# Patient Record
Sex: MALE | Race: WHITE | NOT HISPANIC OR LATINO | Employment: FULL TIME | URBAN - METROPOLITAN AREA
[De-identification: names, ages, dates, MRNs, and addresses within clinical notes are randomized per-mention and may not be internally consistent; named-entity substitution may affect disease eponyms.]

---
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
## 2020-01-13 ENCOUNTER — OFFICE VISIT (OUTPATIENT)
Dept: URGENT CARE | Facility: PHYSICIAN GROUP | Age: 20
End: 2020-01-13
Payer: COMMERCIAL

## 2020-01-13 VITALS
HEART RATE: 79 BPM | RESPIRATION RATE: 16 BRPM | HEIGHT: 67 IN | BODY MASS INDEX: 34.25 KG/M2 | DIASTOLIC BLOOD PRESSURE: 78 MMHG | OXYGEN SATURATION: 97 % | SYSTOLIC BLOOD PRESSURE: 118 MMHG | TEMPERATURE: 97.6 F | WEIGHT: 218.2 LBS

## 2020-01-13 DIAGNOSIS — K12.2 UVULITIS: ICD-10-CM

## 2020-01-13 LAB
INT CON NEG: NEGATIVE
INT CON POS: POSITIVE
S PYO AG THROAT QL: NEGATIVE

## 2020-01-13 PROCEDURE — 87880 STREP A ASSAY W/OPTIC: CPT | Performed by: PHYSICIAN ASSISTANT

## 2020-01-13 PROCEDURE — 99214 OFFICE O/P EST MOD 30 MIN: CPT | Performed by: PHYSICIAN ASSISTANT

## 2020-01-13 RX ORDER — METHYLPREDNISOLONE 4 MG/1
TABLET ORAL
Qty: 21 TAB | Refills: 0 | Status: SHIPPED
Start: 2020-01-13 | End: 2020-06-23

## 2020-01-13 RX ORDER — AMOXICILLIN AND CLAVULANATE POTASSIUM 875; 125 MG/1; MG/1
1 TABLET, FILM COATED ORAL 2 TIMES DAILY
Qty: 20 TAB | Refills: 0 | Status: SHIPPED | OUTPATIENT
Start: 2020-01-13 | End: 2020-01-23

## 2020-01-13 ASSESSMENT — ENCOUNTER SYMPTOMS
FEVER: 0
NAUSEA: 0
SHORTNESS OF BREATH: 0
WHEEZING: 0
PALPITATIONS: 0
SWOLLEN GLANDS: 0
CHILLS: 0
COUGH: 0
DIARRHEA: 0
MYALGIAS: 0
TROUBLE SWALLOWING: 1
SORE THROAT: 1
HEADACHES: 0
NECK PAIN: 0
SPUTUM PRODUCTION: 0
VOMITING: 0
HOARSE VOICE: 0

## 2020-01-13 NOTE — PROGRESS NOTES
"Subjective:      Narciso Casillas is a 19 y.o. male who presents with Sore Throat (has an extremely dry throat, crackling in the back of his throat, cough, feels its swollen, woke up with it this morning)            Pharyngitis    This is a new problem. The current episode started today. The problem has been unchanged. There has been no fever. The pain is moderate. Associated symptoms include trouble swallowing. Pertinent negatives include no congestion, coughing, diarrhea, ear discharge, ear pain, headaches, hoarse voice, plugged ear sensation, neck pain, shortness of breath, swollen glands or vomiting. Associated symptoms comments: Patient reports swelling in the throat. He has tried nothing for the symptoms.       Past Medical History:   Diagnosis Date   • Asperger syndrome    • Psoriasis        No past surgical history on file.    No family history on file.    Allergies   Allergen Reactions   • Sulfa Drugs        Medications, Allergies, and current problem list reviewed today in Epic    Review of Systems   Constitutional: Negative for chills, fever and malaise/fatigue.   HENT: Positive for sore throat and trouble swallowing. Negative for congestion, ear discharge, ear pain and hoarse voice.    Respiratory: Negative for cough, sputum production, shortness of breath and wheezing.    Cardiovascular: Negative for chest pain, palpitations and leg swelling.   Gastrointestinal: Negative for diarrhea, nausea and vomiting.   Musculoskeletal: Negative for myalgias and neck pain.   Neurological: Negative for headaches.       All other systems reviewed and are negative.      Objective:     /78 (BP Location: Right arm, Patient Position: Sitting, BP Cuff Size: Adult)   Pulse 79   Temp 36.4 °C (97.6 °F) (Temporal)   Resp 16   Ht 1.702 m (5' 7\")   Wt 99 kg (218 lb 3.2 oz)   SpO2 97%   BMI 34.17 kg/m²      Physical Exam  Constitutional:       General: He is not in acute distress.     Appearance: Normal appearance. He " is not ill-appearing.   HENT:      Head: Normocephalic and atraumatic.      Right Ear: Tympanic membrane, ear canal and external ear normal.      Left Ear: Tympanic membrane, ear canal and external ear normal.      Nose: Nose normal.      Mouth/Throat:      Mouth: Mucous membranes are moist.      Pharynx: Pharyngeal swelling, posterior oropharyngeal erythema and uvula swelling present. No oropharyngeal exudate.      Tonsils: No tonsillar exudate or tonsillar abscesses. Swellin+ on the right. 2+ on the left.   Eyes:      Conjunctiva/sclera: Conjunctivae normal.   Cardiovascular:      Rate and Rhythm: Normal rate and regular rhythm.      Heart sounds: Normal heart sounds. No murmur. No friction rub. No gallop.    Pulmonary:      Effort: Pulmonary effort is normal.      Breath sounds: Normal breath sounds. No wheezing, rhonchi or rales.   Skin:     General: Skin is warm and dry.      Findings: No rash.   Neurological:      General: No focal deficit present.      Mental Status: He is alert and oriented to person, place, and time.   Psychiatric:         Mood and Affect: Mood normal.         Behavior: Behavior normal.         Thought Content: Thought content normal.         Judgment: Judgment normal.                 Assessment/Plan:       1. Uvulitis  POCT Rapid Strep A    amoxicillin-clavulanate (AUGMENTIN) 875-125 MG Tab    methylPREDNISolone (MEDROL DOSEPAK) 4 MG Tablet Therapy Pack       poct strep- negative      Current Outpatient Medications:   •  amoxicillin-clavulanate (AUGMENTIN) 875-125 MG Tab, Take 1 Tab by mouth 2 times a day for 10 days., Disp: 20 Tab, Rfl: 0    Contingent Medrol given to patient if swelling worsens  •  methylPREDNISolone (MEDROL DOSEPAK) 4 MG Tablet Therapy Pack, Follow schedule on package instructions., Disp: 21 Tab, Rfl: 0  \   Differential diagnoses, Supportive care, and indications for immediate follow-up discussed with patient.   Instructed to return to clinic or nearest emergency  department for any change in condition, further concerns, or worsening of symptoms.    The patient demonstrated a good understanding and agreed with the treatment plan.    Kristina Aguilera P.A.-C.

## 2020-01-13 NOTE — LETTER
January 13, 2020         Patient: Narciso Casillas   YOB: 2000   Date of Visit: 1/13/2020           To Whom it May Concern:    Narciso Casillas was seen in my clinic on 1/13/2020. He is excused from work from 1/13/2020-1/14/2020 due to medical reasons.    If you have any questions or concerns, please don't hesitate to call.        Sincerely,           Kristina Aguilera P.A.-C.  Electronically Signed

## 2020-01-20 ENCOUNTER — OFFICE VISIT (OUTPATIENT)
Dept: URGENT CARE | Facility: PHYSICIAN GROUP | Age: 20
End: 2020-01-20
Payer: COMMERCIAL

## 2020-01-20 VITALS
OXYGEN SATURATION: 98 % | SYSTOLIC BLOOD PRESSURE: 112 MMHG | HEIGHT: 67 IN | TEMPERATURE: 99.6 F | WEIGHT: 210 LBS | RESPIRATION RATE: 16 BRPM | DIASTOLIC BLOOD PRESSURE: 70 MMHG | HEART RATE: 112 BPM | BODY MASS INDEX: 32.96 KG/M2

## 2020-01-20 DIAGNOSIS — L50.9 URTICARIA: ICD-10-CM

## 2020-01-20 DIAGNOSIS — R21 RASH AND NONSPECIFIC SKIN ERUPTION: ICD-10-CM

## 2020-01-20 PROCEDURE — 99214 OFFICE O/P EST MOD 30 MIN: CPT | Performed by: PHYSICIAN ASSISTANT

## 2020-01-20 RX ORDER — METHYLPREDNISOLONE SODIUM SUCCINATE 125 MG/2ML
125 INJECTION, POWDER, LYOPHILIZED, FOR SOLUTION INTRAMUSCULAR; INTRAVENOUS ONCE
Status: COMPLETED | OUTPATIENT
Start: 2020-01-20 | End: 2020-01-20

## 2020-01-20 RX ADMIN — METHYLPREDNISOLONE SODIUM SUCCINATE 125 MG: 125 INJECTION, POWDER, LYOPHILIZED, FOR SOLUTION INTRAMUSCULAR; INTRAVENOUS at 13:30

## 2020-01-20 ASSESSMENT — ENCOUNTER SYMPTOMS
SORE THROAT: 0
SHORTNESS OF BREATH: 0
COUGH: 0
VOMITING: 0
STRIDOR: 0
FEVER: 0
CHILLS: 0
DIARRHEA: 0
ABDOMINAL PAIN: 0
WHEEZING: 0
SPUTUM PRODUCTION: 0
NAUSEA: 0

## 2020-01-20 NOTE — LETTER
January 20, 2020       Patient: Narciso Casillas   YOB: 2000   Date of Visit: 1/20/2020         To Whom It May Concern:    It is my medical opinion that Narciso Casillas should be excused from work for today due to illness.      If you have any questions or concerns, please don't hesitate to call [phone]          Sincerely,          Franko Flores P.A.-C.  Electronically Signed

## 2020-01-20 NOTE — PROGRESS NOTES
"Subjective:   Narciso Casillas  is a 19 y.o. male who presents for Hives (hives around the side of abdomen, legs, neck, and on the head, itchy, pt. noticed it just last night  after being out in the woods, pt took benadryl last night)        Patient comes clinic describing a pruritic rash on bilateral flanks.  Noted first on left flank and on right.  Notes has spread from near waistline up to the axillary area bilaterally.  Notes some on side of neck, forehead and arms as well.  Notes past medical history of similar rash with exposure to sulfa drugs.  Patient is on day 8 of the 10-day amoxicillin course treating uvulitis.  He reports significant improvement of swelling and discomfort in back of throat.  States he did not start/take Medrol Dosepak stating this was reserved for worsened swelling which he did not experience.  He denies worsened swelling or sore throat stating he is only had improvement since treatment began.  Denies past medical history of similar reactions with amoxicillin but cannot recall last time he had this medicine.  Denies nausea vomiting abdominal pain diarrhea or rash.  Notes improvement of symptoms for which he was seen prior.  Denies sensation of swelling to throat or wheezing.  Denies past medical history of anaphylaxis    Review of Systems   Constitutional: Negative for chills and fever.   HENT: Positive for congestion. Negative for ear pain and sore throat ( improved).    Respiratory: Negative for cough, sputum production, shortness of breath, wheezing and stridor.    Gastrointestinal: Negative for abdominal pain, diarrhea, nausea and vomiting.   Skin: Positive for itching and rash.     Allergies   Allergen Reactions   • Sulfa Drugs       Objective:   /70 (BP Location: Left arm, Patient Position: Sitting, BP Cuff Size: Adult)   Pulse (!) 112   Temp 37.6 °C (99.6 °F) (Temporal)   Resp 16   Ht 1.702 m (5' 7\")   Wt 95.3 kg (210 lb)   SpO2 98%   BMI 32.89 kg/m²   Physical " Exam  Vitals signs and nursing note reviewed.   Constitutional:       General: He is not in acute distress.     Appearance: He is well-developed. He is not diaphoretic.   HENT:      Head: Normocephalic and atraumatic.      Right Ear: Tympanic membrane, ear canal and external ear normal.      Left Ear: Tympanic membrane, ear canal and external ear normal.      Nose: Nose normal.      Mouth/Throat:      Lips: Pink.      Mouth: Mucous membranes are moist.      Pharynx: Uvula midline. Posterior oropharyngeal erythema ( mild PND) present. No pharyngeal swelling, oropharyngeal exudate or uvula swelling ( trace erythema to tip, improved swelling).      Tonsils: No tonsillar abscesses.   Eyes:      General: No scleral icterus.        Right eye: No discharge.         Left eye: No discharge.      Conjunctiva/sclera: Conjunctivae normal.   Neck:      Musculoskeletal: Neck supple.   Pulmonary:      Effort: Pulmonary effort is normal. No respiratory distress.      Breath sounds: No decreased breath sounds, wheezing, rhonchi or rales.   Musculoskeletal: Normal range of motion.   Lymphadenopathy:      Cervical: Cervical adenopathy ( mild bilat) present.   Skin:     General: Skin is warm and dry.      Coloration: Skin is not pale.   Neurological:      Mental Status: He is alert and oriented to person, place, and time.      Coordination: Coordination normal.     solumedrol 125 - tolerates well      Assessment/Plan:   1. Rash and nonspecific skin eruption    2. Urticaria  - methylPREDNISolone sod succ (SOLU-MEDROL) 125 MG injection 125 mg    Other orders  - diphenhydrAMINE HCl (BENADRYL ALLERGY PO); Take  by mouth.  Supportive care is reviewed with patient/caregiver - recommend to push PO fluids and electrolytes, Solu-Medrol in clinic today, begin Medrol Dosepak tomorrow morning (from prior evaluation), we reviewed red flag symptoms to return to clinic-improvement in uvula no indication for further antibiotic treatment    Return to  clinic with lack of resolution or progression of symptoms.  ER precautions with any worsening symptoms are reviewed with patient/caregiver and they do express understanding  Cautioned regarding potential side effects of steroid, avoid nsaids while using    Differential diagnosis, natural history, supportive care, and indications for immediate follow-up discussed.

## 2020-05-17 ENCOUNTER — OFFICE VISIT (OUTPATIENT)
Dept: URGENT CARE | Facility: PHYSICIAN GROUP | Age: 20
End: 2020-05-17
Payer: COMMERCIAL

## 2020-05-17 VITALS
HEIGHT: 67 IN | HEART RATE: 74 BPM | DIASTOLIC BLOOD PRESSURE: 84 MMHG | OXYGEN SATURATION: 99 % | RESPIRATION RATE: 16 BRPM | WEIGHT: 210 LBS | SYSTOLIC BLOOD PRESSURE: 128 MMHG | TEMPERATURE: 98.8 F | BODY MASS INDEX: 32.96 KG/M2

## 2020-05-17 DIAGNOSIS — L40.9 PSORIASIS: ICD-10-CM

## 2020-05-17 PROCEDURE — 99214 OFFICE O/P EST MOD 30 MIN: CPT | Performed by: PHYSICIAN ASSISTANT

## 2020-05-17 RX ORDER — CALCIPOTRIENE 50 UG/G
CREAM TOPICAL
Qty: 60 G | Refills: 0 | Status: SHIPPED
Start: 2020-05-17 | End: 2020-09-21

## 2020-05-17 ASSESSMENT — ENCOUNTER SYMPTOMS
SORE THROAT: 0
FEVER: 0
CHILLS: 0

## 2020-05-17 NOTE — PROGRESS NOTES
"Subjective:      Narciso Casillas is a 19 y.o. male who presents with Rash (Bilateral feet, hands and scalp ongoing several months.  Has been using over the counter cream w/o relief.)            HPI  19-year-old male presents to urgent care with new problem of erythematous and pruritic rash over feet.  Denies pain or fever.  This has been intermittent over the last several months.  He has been seen in urgent care for same and treated with for psoriasis with calcipotriene cream.  Patient states his symptoms were temporarily improved. No recent steroid use.  Denies other associated aggravating or alleviating factors.     Review of Systems   Constitutional: Negative for chills, fever and malaise/fatigue.   HENT: Negative for sore throat.    Eyes: Negative for blurred vision and pain.   Respiratory: Negative for cough, shortness of breath, wheezing and stridor.    Cardiovascular: Negative for chest pain.   Gastrointestinal: Negative for abdominal pain, nausea and vomiting.   Musculoskeletal: Negative for myalgias.   Skin: Positive for itching and rash.   Neurological: Negative for dizziness, sensory change and headaches.   Endo/Heme/Allergies: Positive for environmental allergies. Does not bruise/bleed easily.       Past Medical History:   Diagnosis Date   • Asperger syndrome    • Psoriasis      Medications and allergies reviewed in [facility].  Social History     Tobacco Use   • Smoking status: Never Smoker   • Smokeless tobacco: Never Used   Substance Use Topics   • Alcohol use: No      Objective:     /84   Pulse 74   Temp 37.1 °C (98.8 °F) (Temporal)   Resp 16   Ht 1.702 m (5' 7\")   Wt 95.3 kg (210 lb)   SpO2 99%   BMI 32.89 kg/m²      Physical Exam  Vitals signs reviewed.   Constitutional:       General: He is not in acute distress.     Appearance: Normal appearance. He is well-developed.   HENT:      Head: Normocephalic and atraumatic.      Mouth/Throat:      Mouth: Mucous membranes are moist.      Pharynx: " Oropharynx is clear.   Eyes:      Conjunctiva/sclera: Conjunctivae normal.   Neck:      Musculoskeletal: Normal range of motion and neck supple.   Cardiovascular:      Rate and Rhythm: Normal rate and regular rhythm.      Heart sounds: Normal heart sounds.   Pulmonary:      Effort: Pulmonary effort is normal. No respiratory distress.      Breath sounds: Normal breath sounds.   Musculoskeletal: Normal range of motion.   Skin:     General: Skin is warm and dry.      Capillary Refill: Capillary refill takes less than 2 seconds.      Findings: Rash present. No erythema. Rash is purpuric and scaling.          Neurological:      General: No focal deficit present.      Mental Status: He is alert and oriented to person, place, and time.   Psychiatric:         Mood and Affect: Mood normal.         Behavior: Behavior normal.         Thought Content: Thought content normal.         Judgment: Judgment normal.                 Assessment/Plan:     1. Psoriasis  REFERRAL TO DERMATOLOGY    calcipotriene (DOVONEX) 0.005 % Cream     Given referral to dermatology for further evaluation and consultation.  PT should follow up with PCP in 1-2 days for re-evaluation if symptoms have not improved.  Discussed red flags and reasons to return to .  Pt and/or family verbalized understanding of diagnosis and follow up instructions and was offered informational handout on diagnosis.  PT discharged.

## 2020-05-17 NOTE — LETTER
May 17, 2020         Patient: Narciso Casillas   YOB: 2000   Date of Visit: 5/17/2020           To Whom it May Concern:    Narciso Casillas was seen in my clinic on 5/17/2020. Please excuse him from work 5/17 - 5/18.    If you have any questions or concerns, please don't hesitate to call.        Sincerely,           Eileen Sanders P.A.-C.  Electronically Signed

## 2020-05-18 ASSESSMENT — ENCOUNTER SYMPTOMS
DIZZINESS: 0
BLURRED VISION: 0
VOMITING: 0
MYALGIAS: 0
EYE PAIN: 0
HEADACHES: 0
NAUSEA: 0
SHORTNESS OF BREATH: 0
STRIDOR: 0
WHEEZING: 0
ABDOMINAL PAIN: 0
COUGH: 0
SENSORY CHANGE: 0
BRUISES/BLEEDS EASILY: 0

## 2020-05-21 ENCOUNTER — TELEPHONE (OUTPATIENT)
Dept: URGENT CARE | Facility: PHYSICIAN GROUP | Age: 20
End: 2020-05-21

## 2020-05-21 RX ORDER — CLOBETASOL PROPIONATE 0.5 MG/G
CREAM TOPICAL
Qty: 1 TUBE | Refills: 0 | Status: SHIPPED
Start: 2020-05-21 | End: 2020-09-21

## 2020-05-21 NOTE — TELEPHONE ENCOUNTER
Hello,   I have sent the patient a different treatment option. Can you let him know the medication will be at his pharmacy for him?     Thank you!  Eileen

## 2020-05-21 NOTE — TELEPHONE ENCOUNTER
1. Name: Narciso Casillas    Call Back Number: [phone]      How would the patient prefer to be contacted with a response: Phone call OK to leave a detailed message    2. Which medication(s) is being requested?calcipotriene (DOVONEX) 0.005 % Cream medication not covered by insurance please send alternative        Patient was informed they may receive a return phone call from our office with any additional questions before processing this request.

## 2020-05-28 ENCOUNTER — TELEMEDICINE (OUTPATIENT)
Dept: DERMATOLOGY | Facility: IMAGING CENTER | Age: 20
End: 2020-05-28
Payer: COMMERCIAL

## 2020-05-28 DIAGNOSIS — L40.9 PSORIASIS: ICD-10-CM

## 2020-05-28 PROCEDURE — 99203 OFFICE O/P NEW LOW 30 MIN: CPT | Mod: 95,CR | Performed by: DERMATOLOGY

## 2020-05-28 RX ORDER — CLOBETASOL PROPIONATE 0.46 MG/ML
SOLUTION TOPICAL
Qty: 60 ML | Refills: 3 | Status: SHIPPED | OUTPATIENT
Start: 2020-05-28 | End: 2021-10-04

## 2020-05-28 RX ORDER — CLOBETASOL PROPIONATE 0.5 MG/G
OINTMENT TOPICAL
Qty: 60 G | Refills: 3 | Status: SHIPPED | OUTPATIENT
Start: 2020-05-28

## 2020-05-28 NOTE — PROGRESS NOTES
VIRTUAL VIDEO DERMATOLOGY VISIT     As a means of avoiding spread of COVID-19, this visit is being conducted by synchronous video with patient. This video visit was initiated by the patient and they verbally consented.    Chief complaint: rash    HPI: legs and feet, elbows, scalp, knuckles  Onset: >10 years, about 1 month on the foot  Symptoms: itching  Aggravating factors: clobetasol and calcipotriene  Alleviating factors: no  Treatments: clobetasol started on 5/21/20; calcipotriene, medrol dose pack  Joint pain: no  Family hx psoriasis: no  Nails affected: no    History of skin cancer: No  Family history of skin cancer:Yes, Details: great grandfather (unknown type)    Past Medical History:   Diagnosis Date   • Asperger syndrome    • Psoriasis         History reviewed. No pertinent family history.     Social History     Socioeconomic History   • Marital status: Single     Spouse name: Not on file   • Number of children: Not on file   • Years of education: Not on file   • Highest education level: Not on file   Occupational History   • Not on file   Social Needs   • Financial resource strain: Not on file   • Food insecurity     Worry: Not on file     Inability: Not on file   • Transportation needs     Medical: Not on file     Non-medical: Not on file   Tobacco Use   • Smoking status: Never Smoker   • Smokeless tobacco: Never Used   Substance and Sexual Activity   • Alcohol use: No   • Drug use: No   • Sexual activity: Never   Lifestyle   • Physical activity     Days per week: Not on file     Minutes per session: Not on file   • Stress: Not on file   Relationships   • Social connections     Talks on phone: Not on file     Gets together: Not on file     Attends Orthodox service: Not on file     Active member of club or organization: Not on file     Attends meetings of clubs or organizations: Not on file     Relationship status: Not on file   • Intimate partner violence     Fear of current or ex partner: Not on file      Emotionally abused: Not on file     Physically abused: Not on file     Forced sexual activity: Not on file   Other Topics Concern   • Behavioral problems No   • Interpersonal relationships No   • Sad or not enjoying activities No   • Suicidal thoughts No   • Poor school performance No   • Reading difficulties No   • Speech difficulties No   • Writing difficulties Yes   • Inadequate sleep No   • Excessive TV viewing No   • Excessive video game use No   • Inadequate exercise No   • Sports related No   • Poor diet Yes   • Family concerns for drug/alcohol abuse No   • Poor oral hygiene Yes   • Bike safety No   • Family concerns vehicle safety No   Social History Narrative   • Not on file        Allergies   Allergen Reactions   • Sulfa Drugs         MEDICATIONS:  Medications relevant to specialty reviewed.    Current Outpatient Medications:   •  clobetasol (TEMOVATE) 0.05 % external solution, Apply daily to affected areas of the scalp for up to 3 weeks, then stop 1 week. Repeat as needed., Disp: 60 mL, Rfl: 3  •  clobetasol (TEMOVATE) 0.05 % Ointment, Apply BID to affected areas of body until skin is smooth or up to 3 weeks, then stop 1 week. Repeat as needed. Do not use on face/genitals., Disp: 60 g, Rfl: 3  •  clobetasol (TEMOVATE) 0.05 % Cream, Apply 1G to affected area BID, Disp: 1 Tube, Rfl: 0  •  calcipotriene (DOVONEX) 0.005 % Cream, Right 1 g to affected area twice daily, Disp: 60 g, Rfl: 0  •  diphenhydrAMINE HCl (BENADRYL ALLERGY PO), Take  by mouth., Disp: , Rfl:   •  methylPREDNISolone (MEDROL DOSEPAK) 4 MG Tablet Therapy Pack, Follow schedule on package instructions. (Patient not taking: Reported on 1/20/2020), Disp: 21 Tab, Rfl: 0  •  ondansetron (ZOFRAN ODT) 4 MG TABLET DISPERSIBLE, Take 1 Tab by mouth every 6 hours as needed for Nausea. (Patient not taking: Reported on 1/13/2020), Disp: 10 Tab, Rfl: 0     REVIEW OF SYSTEMS:   Positive for skin (see HPI)  Negative for fevers, chills, joint pain and  cough       EXAM:  There were no vitals taken for this visit.  Constitutional: Well-developed, well-nourished, and in no distress.   Neurological: Alert and oriented.    A focused skin exam limited by quality of video was performed including the affected areas of the head (including face), neck and bilateral lower extremities. Notable findings on exam today listed below.   Additionally, photos sent by patient via [employer] were reviewed.    -bilateral feet and lower legs with well demarcated erythematous plaques with minimal micaceous scale    IMPRESSION / PLAN:    1. Psoriasis, BSA ~4% (estimated by patient using his palm)  -psoriatic arthritis? no  -educated patient about diagnosis, management options, and expectations of treatment  -discussed the nature of the condition and that there is no cure   -discussed associated co-morbidities, including systemic inflammation (i.e. Joint involvement, cardiovascular disease); smoking cessation discussed  -instructed to start clobetasol 0.05% ointment to affected area of body twice daily to active areas for up to 3 weeks, stop 1 week, repeat prn  -start clobetasol 0.05% solution to affected area of the scalp daily up to 3 weeks, stop 1 week, repeat prn  -Side effects of topical steroids discussed, including systemic absorption, skin thinning, appearance of stretch marks (striae), easy bruising, telangiectasias, and possible increased hair growth   -Patient instructed to avoid face, axilla, and groin area with above topical steroids  -Dovonex too expensive/not covered  -Continue current tea tree oil shampoo - pt thinks it helps  -discussed importance of moisturizing regularly/several times a day  -pending response, consider adding calcipotriene ointment (appears to be on formulary) vs calcipo-betamethasone combo (aslo appears to be on formulary)    Return to clinic in: Return in about 3 months (around 8/28/2020). and as needed for any new or changing skin lesions.    Alberta FUENTES  SHANNON Mccarthy.

## 2020-06-23 ENCOUNTER — OFFICE VISIT (OUTPATIENT)
Dept: MEDICAL GROUP | Facility: PHYSICIAN GROUP | Age: 20
End: 2020-06-23
Payer: COMMERCIAL

## 2020-06-23 VITALS
TEMPERATURE: 98.4 F | SYSTOLIC BLOOD PRESSURE: 120 MMHG | WEIGHT: 208.8 LBS | OXYGEN SATURATION: 95 % | DIASTOLIC BLOOD PRESSURE: 70 MMHG | HEART RATE: 66 BPM | HEIGHT: 68 IN | BODY MASS INDEX: 31.64 KG/M2

## 2020-06-23 DIAGNOSIS — Z00.00 HEALTH CARE MAINTENANCE: ICD-10-CM

## 2020-06-23 DIAGNOSIS — L40.9 PSORIASIS: ICD-10-CM

## 2020-06-23 DIAGNOSIS — Z13.1 DIABETES MELLITUS SCREENING: ICD-10-CM

## 2020-06-23 DIAGNOSIS — Z13.220 LIPID SCREENING: ICD-10-CM

## 2020-06-23 DIAGNOSIS — E66.9 OBESITY (BMI 30.0-34.9): ICD-10-CM

## 2020-06-23 PROCEDURE — 99203 OFFICE O/P NEW LOW 30 MIN: CPT | Performed by: INTERNAL MEDICINE

## 2020-06-23 NOTE — PROGRESS NOTES
New Patient to establish    Chief Complaint   Patient presents with   • Establish Care   • Psoriasis       Subjective:     History of Present Illness: Patient is a 19 y.o. male who is here today to establish primary care      1. Obesity (BMI 30.0-34.9)  -Patient is aware of obesity, would like to have some loss of weight, eating healthier with starting healthy lifestyle,    Psoriasis   -Multiple psoriatic skin lesion on both elbows, hands, knees, feet, scalp,  -Patient is following up with dermatology, after using the clobetasol ointment as well as Dovonex cream patient is getting better  -Patient has an appointment dermatology August 2020    -Patient otherwise has no concern, in chart review mention that he had Asperger syndrome, however patient is not aware of that syndrome    Current Outpatient Medications on File Prior to Visit   Medication Sig Dispense Refill   • clobetasol (TEMOVATE) 0.05 % external solution Apply daily to affected areas of the scalp for up to 3 weeks, then stop 1 week. Repeat as needed. 60 mL 3   • clobetasol (TEMOVATE) 0.05 % Ointment Apply BID to affected areas of body until skin is smooth or up to 3 weeks, then stop 1 week. Repeat as needed. Do not use on face/genitals. 60 g 3   • calcipotriene (DOVONEX) 0.005 % Cream Right 1 g to affected area twice daily 60 g 0   • diphenhydrAMINE HCl (BENADRYL ALLERGY PO) Take  by mouth.     • clobetasol (TEMOVATE) 0.05 % Cream Apply 1G to affected area BID 1 Tube 0     No current facility-administered medications on file prior to visit.      Allergies   Allergen Reactions   • Sulfa Drugs      Patient Active Problem List    Diagnosis Date Noted   • Obesity (BMI 30.0-34.9) 06/23/2020   • Asperger syndrome 07/25/2017     Past Medical History:   Diagnosis Date   • Allergy    • Asperger syndrome    • Psoriasis      History reviewed. No pertinent surgical history.  Family History   Problem Relation Age of Onset   • No Known Problems Mother    • Lung Disease  "Father         asthma   • Asthma Brother    • Lung Disease Brother         asthma   • No Known Problems Maternal Grandmother    • No Known Problems Maternal Grandfather    • No Known Problems Paternal Grandmother    • No Known Problems Paternal Grandfather      Social History     Tobacco Use   • Smoking status: Never Smoker   • Smokeless tobacco: Never Used   Substance Use Topics   • Alcohol use: No   • Drug use: No     ROS:     - Constitutional: Negative for fever, chills,    - Eye: Negative for blurry vision    -ENT: Negative for ear pain    - Respiratory: Negative for cough, hemoptysis    - Cardiovascular: Negative for chest pain     - Gastrointestinal: Negative for abdominal pain    - Genitourinary: Negative for dysuria    - Musculoskeletal: Negative for joint swelling    - Skin: Negative for itching    - Neurological: Negative for focal weakness     - Psychiatric/Behavioral: Negative for depression        Physical Exam:     /70 (BP Location: Left arm, Patient Position: Sitting, BP Cuff Size: Adult)   Pulse 66   Temp 36.9 °C (98.4 °F) (Temporal)   Ht 1.735 m (5' 8.31\")   Wt 94.7 kg (208 lb 12.8 oz)   SpO2 95%   BMI 31.46 kg/m²   General: Normal appearing. No distress.  ENT: oropharynx without exudates.    Eyes: conjunctiva clear lids without ptosis  Pulmonary: Clear to ausculation.  Normal effort.   Cardiovascular: Regular rate and rhythm  Abdomen: Soft, nontender,  Lymph: No cervical or supraclavicular palpable lymph nodes  Psych: Normal mood and affect.     I have reviewed pertinent labs and diagnostic tests associated with this visit with specific comments listed under the assessment and plan below      Assessment and Plan:     1. Obesity (BMI 30.0-34.9)  >> Educated regarding healthy diet, would like to Visit to discuss in detail about healthy lifestyle  - Comp Metabolic Panel; Future  - HEMOGLOBIN A1C; Future  - Lipid Profile; Future  - TSH WITH REFLEX TO FT4; Future  - VITAMIN D,25 HYDROXY; " Future  - VIT B12,  FOLIC ACID    2. Health care maintenance  - Comp Metabolic Panel; Future  - HEMOGLOBIN A1C; Future  - Lipid Profile; Future  - TSH WITH REFLEX TO FT4; Future  - VITAMIN D,25 HYDROXY; Future    3. Diabetes mellitus screening  - Comp Metabolic Panel; Future  - HEMOGLOBIN A1C; Future    4. Lipid screening  - Lipid Profile; Future    Psoriasis  -Continue cream Per dermatology      Follow Up:      Return in about 6 weeks (around 8/4/2020) for follow up, obesity, labs.    Please note that this dictation was created using voice recognition software. I have made every reasonable attempt to correct obvious errors, but I expect that there are errors of grammar and possibly content that I did not discover before finalizing the note.    Signed by: Sade Stuart M.D.

## 2020-08-10 ENCOUNTER — OFFICE VISIT (OUTPATIENT)
Dept: DERMATOLOGY | Facility: IMAGING CENTER | Age: 20
End: 2020-08-10
Payer: COMMERCIAL

## 2020-08-10 DIAGNOSIS — L40.9 PSORIASIS: ICD-10-CM

## 2020-08-10 PROCEDURE — 99213 OFFICE O/P EST LOW 20 MIN: CPT | Performed by: DERMATOLOGY

## 2020-08-10 RX ORDER — CALCIPOTRIENE, BETAMETHASONE DIPROPIONATE 50; .643 UG/G; MG/G
OINTMENT TOPICAL
Qty: 100 G | Refills: 3 | Status: SHIPPED
Start: 2020-08-10 | End: 2020-09-21

## 2020-08-10 NOTE — PROGRESS NOTES
RETURN DERMATOLOGY VISIT    Chief complaint: Follow up psoriasis   Has improved with clobetasol topical medication , but now moving to other areas such as elbows, hands. Clears with 1-2 weeks of clobetasol to individual spot.     Initial hx:  HPI: legs and feet, elbows, scalp, knuckles  Onset: >10 years, about 1 month on the foot  Symptoms: itching  Aggravating factors: clobetasol and calcipotriene  Alleviating factors: no  Treatments: clobetasol started on 5/21/20; calcipotriene, medrol dose pack  Joint pain: no  Family hx psoriasis: no  Nails affected: no    History of skin cancer: No  Family history of skin cancer:Yes, Details: great grandfather (unknown type)    Past Medical History:   Diagnosis Date   • Allergy    • Asperger syndrome    • Psoriasis         Family History   Problem Relation Age of Onset   • No Known Problems Mother    • Lung Disease Father         asthma   • Asthma Brother    • Lung Disease Brother         asthma   • No Known Problems Maternal Grandmother    • No Known Problems Maternal Grandfather    • No Known Problems Paternal Grandmother    • No Known Problems Paternal Grandfather         Social History     Socioeconomic History   • Marital status: Single     Spouse name: Not on file   • Number of children: Not on file   • Years of education: Not on file   • Highest education level: Not on file   Occupational History   • Not on file   Social Needs   • Financial resource strain: Not on file   • Food insecurity     Worry: Not on file     Inability: Not on file   • Transportation needs     Medical: Not on file     Non-medical: Not on file   Tobacco Use   • Smoking status: Never Smoker   • Smokeless tobacco: Never Used   Substance and Sexual Activity   • Alcohol use: No   • Drug use: No   • Sexual activity: Never   Lifestyle   • Physical activity     Days per week: Not on file     Minutes per session: Not on file   • Stress: Not on file   Relationships   • Social connections     Talks on phone:  Not on file     Gets together: Not on file     Attends Taoist service: Not on file     Active member of club or organization: Not on file     Attends meetings of clubs or organizations: Not on file     Relationship status: Not on file   • Intimate partner violence     Fear of current or ex partner: Not on file     Emotionally abused: Not on file     Physically abused: Not on file     Forced sexual activity: Not on file   Other Topics Concern   • Behavioral problems No   • Interpersonal relationships No   • Sad or not enjoying activities No   • Suicidal thoughts No   • Poor school performance No   • Reading difficulties No   • Speech difficulties No   • Writing difficulties Yes   • Inadequate sleep No   • Excessive TV viewing No   • Excessive video game use No   • Inadequate exercise No   • Sports related No   • Poor diet Yes   • Family concerns for drug/alcohol abuse No   • Poor oral hygiene Yes   • Bike safety No   • Family concerns vehicle safety No   Social History Narrative   • Not on file        Allergies   Allergen Reactions   • Sulfa Drugs         MEDICATIONS:  Medications relevant to specialty reviewed.    Current Outpatient Medications:   •  calcipotriene-betamethasone (TACLONEX) ointment, Apply daily to psoriasis until the skin is smooth or up to 4 wks, then stop 1 wk, repeat prn. Once clear use 2-3x per week for maintenance., Disp: 100 g, Rfl: 3  •  clobetasol (TEMOVATE) 0.05 % external solution, Apply daily to affected areas of the scalp for up to 3 weeks, then stop 1 week. Repeat as needed., Disp: 60 mL, Rfl: 3  •  clobetasol (TEMOVATE) 0.05 % Ointment, Apply BID to affected areas of body until skin is smooth or up to 3 weeks, then stop 1 week. Repeat as needed. Do not use on face/genitals., Disp: 60 g, Rfl: 3  •  clobetasol (TEMOVATE) 0.05 % Cream, Apply 1G to affected area BID, Disp: 1 Tube, Rfl: 0  •  calcipotriene (DOVONEX) 0.005 % Cream, Right 1 g to affected area twice daily, Disp: 60 g, Rfl:  0  •  diphenhydrAMINE HCl (BENADRYL ALLERGY PO), Take  by mouth., Disp: , Rfl:      REVIEW OF SYSTEMS:   Positive for skin (see HPI)  Negative for fevers, chills, joint pain and cough       EXAM:  There were no vitals taken for this visit.  Constitutional: Well-developed, well-nourished, and in no distress.   HENT: Head normocephalic and atraumatic.   Neurological: Alert and oriented.    A focused skin exam was performed including the affected areas of the head (including face), neck, bilateral upper extremities and bilateral lower extremities. Notable findings on exam today listed below. Remaining above-listed examined areas within normal limits / negative for rashes or lesions.    -bilateral feet, lower legs, hands and elbows with few well demarcated erythematous papules with minimal micaceous scale    IMPRESSION / PLAN:    1. Psoriasis, BSA <2%  -psoriatic arthritis? no  -educated patient about diagnosis, management options, and expectations of treatment  -discussed the nature of the condition and that there is no cure   -discussed associated co-morbidities, including systemic inflammation (i.e. Joint involvement, cardiovascular disease); smoking cessation discussed  -start Taclonex daily up to 4 weeks, stop 1 week, repeat prn. For maintenenace use 2-3 times per week as needed. If too costly, pt to call and will send Dovonex cream to be used with clobetasol and during maintenance.  -cont clobetasol 0.05% solution to affected area of the scalp daily up to 3 weeks, stop 1 week, repeat prn  -Side effects of topical steroids discussed, including systemic absorption, skin thinning, appearance of stretch marks (striae), easy bruising, telangiectasias, and possible increased hair growth   -Patient instructed to avoid face, axilla, and groin area with above topical steroids  -Continue current tea tree oil shampoo - pt thinks it helps  -discussed importance of moisturizing regularly/several times a day    Return to clinic  in: Return in about 6 months (around 2/10/2021). and as needed for any new or changing skin lesions.    Alberta Mccarthy M.D.

## 2020-08-13 ENCOUNTER — HOSPITAL ENCOUNTER (OUTPATIENT)
Dept: LAB | Facility: MEDICAL CENTER | Age: 20
End: 2020-08-13
Attending: INTERNAL MEDICINE
Payer: COMMERCIAL

## 2020-08-13 DIAGNOSIS — Z13.1 DIABETES MELLITUS SCREENING: ICD-10-CM

## 2020-08-13 DIAGNOSIS — E66.9 OBESITY (BMI 30.0-34.9): ICD-10-CM

## 2020-08-13 DIAGNOSIS — Z00.00 HEALTH CARE MAINTENANCE: ICD-10-CM

## 2020-08-13 DIAGNOSIS — Z13.220 LIPID SCREENING: ICD-10-CM

## 2020-08-13 LAB
25(OH)D3 SERPL-MCNC: 22 NG/ML (ref 30–100)
ALBUMIN SERPL BCP-MCNC: 4.8 G/DL (ref 3.2–4.9)
ALBUMIN/GLOB SERPL: 1.8 G/DL
ALP SERPL-CCNC: 88 U/L (ref 30–99)
ALT SERPL-CCNC: 47 U/L (ref 2–50)
ANION GAP SERPL CALC-SCNC: 14 MMOL/L (ref 7–16)
AST SERPL-CCNC: 28 U/L (ref 12–45)
BILIRUB SERPL-MCNC: 0.6 MG/DL (ref 0.1–1.5)
BUN SERPL-MCNC: 11 MG/DL (ref 8–22)
CALCIUM SERPL-MCNC: 9.8 MG/DL (ref 8.5–10.5)
CHLORIDE SERPL-SCNC: 101 MMOL/L (ref 96–112)
CHOLEST SERPL-MCNC: 123 MG/DL (ref 100–199)
CO2 SERPL-SCNC: 25 MMOL/L (ref 20–33)
CREAT SERPL-MCNC: 1.28 MG/DL (ref 0.5–1.4)
EST. AVERAGE GLUCOSE BLD GHB EST-MCNC: 117 MG/DL
FASTING STATUS PATIENT QL REPORTED: NORMAL
FOLATE SERPL-MCNC: 5.3 NG/ML
GLOBULIN SER CALC-MCNC: 2.7 G/DL (ref 1.9–3.5)
GLUCOSE SERPL-MCNC: 94 MG/DL (ref 65–99)
HBA1C MFR BLD: 5.7 % (ref 0–5.6)
HDLC SERPL-MCNC: 38 MG/DL
LDLC SERPL CALC-MCNC: 75 MG/DL
POTASSIUM SERPL-SCNC: 4.4 MMOL/L (ref 3.6–5.5)
PROT SERPL-MCNC: 7.5 G/DL (ref 6–8.2)
SODIUM SERPL-SCNC: 140 MMOL/L (ref 135–145)
T4 FREE SERPL-MCNC: 1.3 NG/DL (ref 0.93–1.7)
TRIGL SERPL-MCNC: 52 MG/DL (ref 0–149)
TSH SERPL DL<=0.005 MIU/L-ACNC: 7.12 UIU/ML (ref 0.38–5.33)
VIT B12 SERPL-MCNC: 230 PG/ML (ref 211–911)

## 2020-08-13 PROCEDURE — 84443 ASSAY THYROID STIM HORMONE: CPT

## 2020-08-13 PROCEDURE — 82746 ASSAY OF FOLIC ACID SERUM: CPT

## 2020-08-13 PROCEDURE — 80061 LIPID PANEL: CPT

## 2020-08-13 PROCEDURE — 82607 VITAMIN B-12: CPT

## 2020-08-13 PROCEDURE — 82306 VITAMIN D 25 HYDROXY: CPT

## 2020-08-13 PROCEDURE — 36415 COLL VENOUS BLD VENIPUNCTURE: CPT

## 2020-08-13 PROCEDURE — 84439 ASSAY OF FREE THYROXINE: CPT

## 2020-08-13 PROCEDURE — 80053 COMPREHEN METABOLIC PANEL: CPT

## 2020-08-13 PROCEDURE — 83036 HEMOGLOBIN GLYCOSYLATED A1C: CPT

## 2020-08-14 ENCOUNTER — TELEPHONE (OUTPATIENT)
Dept: MEDICAL GROUP | Facility: PHYSICIAN GROUP | Age: 20
End: 2020-08-14

## 2020-08-14 NOTE — TELEPHONE ENCOUNTER
----- Message from Sade Stuart M.D. sent at 8/14/2020  3:04 PM PDT -----  Please inform him to be seen in the office sooner than later to discuss about abnormal thyroid level as well as elevated A1c.  Thanks

## 2020-08-17 ENCOUNTER — TELEPHONE (OUTPATIENT)
Dept: MEDICAL GROUP | Facility: PHYSICIAN GROUP | Age: 20
End: 2020-08-17

## 2020-09-21 ENCOUNTER — OFFICE VISIT (OUTPATIENT)
Dept: URGENT CARE | Facility: PHYSICIAN GROUP | Age: 20
End: 2020-09-21
Payer: COMMERCIAL

## 2020-09-21 VITALS
OXYGEN SATURATION: 98 % | HEIGHT: 68 IN | BODY MASS INDEX: 33.07 KG/M2 | DIASTOLIC BLOOD PRESSURE: 80 MMHG | SYSTOLIC BLOOD PRESSURE: 132 MMHG | TEMPERATURE: 98.1 F | HEART RATE: 72 BPM | RESPIRATION RATE: 16 BRPM | WEIGHT: 218.2 LBS

## 2020-09-21 DIAGNOSIS — L03.115 CELLULITIS OF RIGHT FOOT: ICD-10-CM

## 2020-09-21 DIAGNOSIS — Z87.2 HISTORY OF PSORIASIS: ICD-10-CM

## 2020-09-21 PROCEDURE — 99214 OFFICE O/P EST MOD 30 MIN: CPT | Performed by: NURSE PRACTITIONER

## 2020-09-21 RX ORDER — CEPHALEXIN 500 MG/1
500 CAPSULE ORAL 4 TIMES DAILY
Qty: 28 CAP | Refills: 0 | Status: SHIPPED | OUTPATIENT
Start: 2020-09-21 | End: 2020-09-28

## 2020-09-21 ASSESSMENT — ENCOUNTER SYMPTOMS
CONSTITUTIONAL NEGATIVE: 1
CHILLS: 0
FEVER: 0

## 2020-09-21 ASSESSMENT — VISUAL ACUITY: OU: 1

## 2020-09-21 NOTE — PROGRESS NOTES
Subjective:     Narciso Casillas is a 20 y.o. male who presents for Rash (poss psoriasis, pt believes toes on R foot may be infected, x3 weeks )       Rash  This is a new problem. The current episode started in the past 7 days. The problem has been gradually worsening since onset. Pertinent negatives include no fever.     Patient reports a history of psoriasis of his skin with chronic, recurrent, red rash and uses a steroid cream as well as an additional cream as needed.  However, reports worsening redness and swelling of his right foot.  Reports symptoms are worse at his right third toe and has mild pain there.  He feels it may be infected.  Has been performing Epsom soaks of his feet.  Has mild pain of his right third toe.  Denies fever, chills, or other symptoms.    Patient was screened prior to rooming and denied COVID-19 diagnosis or contact with a person who has been diagnosed or is suspected to have COVID-19. During this visit, appropriate PPE was worn, hand hygiene was performed, and the patient and any visitors were masked.     PMH:  has a past medical history of Allergy, Asperger syndrome, and Psoriasis. He also has no past medical history of Diabetes ([facility]) or Hypertension.    MEDS:   Current Outpatient Medications:   •  cephALEXin (KEFLEX) 500 MG Cap, Take 1 Cap by mouth 4 times a day for 7 days., Disp: 28 Cap, Rfl: 0  •  clobetasol (TEMOVATE) 0.05 % external solution, Apply daily to affected areas of the scalp for up to 3 weeks, then stop 1 week. Repeat as needed., Disp: 60 mL, Rfl: 3  •  clobetasol (TEMOVATE) 0.05 % Ointment, Apply BID to affected areas of body until skin is smooth or up to 3 weeks, then stop 1 week. Repeat as needed. Do not use on face/genitals., Disp: 60 g, Rfl: 3    ALLERGIES:   Allergies   Allergen Reactions   • Sulfa Drugs      SURGHX: History reviewed. No pertinent surgical history.    SOCHX:  reports that he has never smoked. He has never used smokeless tobacco. He reports that he  "does not drink alcohol or use drugs.     FH: Reviewed with patient, not pertinent to this visit.    Review of Systems   Constitutional: Negative.  Negative for chills, fever and malaise/fatigue.   Musculoskeletal:        R foot swelling   Skin: Positive for rash.   All other systems reviewed and are negative.    Additional details per HPI.      Objective:     /80 (BP Location: Right arm, Patient Position: Sitting, BP Cuff Size: Adult)   Pulse 72   Temp 36.7 °C (98.1 °F) (Temporal)   Resp 16   Ht 1.735 m (5' 8.31\")   Wt 99 kg (218 lb 3.2 oz)   SpO2 98%   BMI 32.88 kg/m²     Physical Exam  Vitals signs reviewed.   Constitutional:       General: He is not in acute distress.     Appearance: He is well-developed. He is not ill-appearing or toxic-appearing.   HENT:      Head: Normocephalic.      Right Ear: External ear normal.      Left Ear: External ear normal.      Nose: Nose normal.   Eyes:      General: Vision grossly intact.      Extraocular Movements: Extraocular movements intact.      Conjunctiva/sclera: Conjunctivae normal.   Neck:      Musculoskeletal: Normal range of motion.   Cardiovascular:      Rate and Rhythm: Normal rate.      Pulses: Normal pulses.   Pulmonary:      Effort: Pulmonary effort is normal. No respiratory distress.   Musculoskeletal: Normal range of motion.         General: No deformity.      Right foot: Normal range of motion. Swelling (R foot slightly larger than left; mild swelling and erythema of right 3rd toe) present. No tenderness, deformity or laceration.   Skin:     General: Skin is warm and dry.      Capillary Refill: Capillary refill takes less than 2 seconds.      Coloration: Skin is not pale.      Findings: Rash (Dry, erythematous, scaly, indurated patches at bilateral feet) present.   Neurological:      Mental Status: He is alert and oriented to person, place, and time.      Sensory: No sensory deficit.      Motor: No weakness.      Coordination: Coordination normal. "      Gait: Gait normal.   Psychiatric:         Behavior: Behavior normal. Behavior is cooperative.       Assessment/Plan:     1. Cellulitis of right foot  - cephALEXin (KEFLEX) 500 MG Cap; Take 1 Cap by mouth 4 times a day for 7 days.  Dispense: 28 Cap; Refill: 0    2. History of psoriasis    Rx as above sent electronically.    Differential diagnosis, natural history, supportive care, over-the-counter symptom management per 's instructions, close monitoring, and indications for immediate follow-up discussed.     Patient advised to: Return for 1) Symptoms that worsen/don't improve, or go to ER, 2) Follow up with primary care in 7-10 days.    All questions answered. Patient agrees with the plan of care.

## 2020-10-26 ENCOUNTER — TELEPHONE (OUTPATIENT)
Dept: URGENT CARE | Facility: CLINIC | Age: 20
End: 2020-10-26

## 2020-10-26 NOTE — PROGRESS NOTES
VIRTUAL VIDEO DERMATOLOGY VISIT     As a means of avoiding spread of COVID-19, this visit is being conducted by synchronous video with patient. This video visit was initiated by the patient and they verbally consented.    Chief complaint: Follow up psoriasis   HPI: Patient returns for follow up for management of psoriasis that is flaring again.  Locations affected: feet  Symptoms: flaking, red, itching  Current treatment: Taclonex cleared everything with about 2-4 weeks of use and then stayed clear for about a month and then started flaring over last 2 weeks and seems like it is not responding to the Taclonex anymore    Initial hx:  HPI: legs and feet, elbows, scalp, knuckles  Onset: >10 years, about 1 month on the foot  Symptoms: itching  Aggravating factors: clobetasol and calcipotriene  Alleviating factors: no  Treatments: clobetasol started on 5/21/20; calcipotriene, medrol dose pack  Joint pain: no  Family hx psoriasis: no  Nails affected: no    History of skin cancer: No  Family history of skin cancer:Yes, Details: great grandfather (unknown type)    Past Medical History:   Diagnosis Date   • Allergy    • Asperger syndrome    • Psoriasis         Family History   Problem Relation Age of Onset   • No Known Problems Mother    • Lung Disease Father         asthma   • Asthma Brother    • Lung Disease Brother         asthma   • No Known Problems Maternal Grandmother    • No Known Problems Maternal Grandfather    • No Known Problems Paternal Grandmother    • No Known Problems Paternal Grandfather         Social History     Socioeconomic History   • Marital status: Single     Spouse name: Not on file   • Number of children: Not on file   • Years of education: Not on file   • Highest education level: Not on file   Occupational History   • Not on file   Social Needs   • Financial resource strain: Not on file   • Food insecurity     Worry: Not on file     Inability: Not on file   • Transportation needs     Medical: Not  on file     Non-medical: Not on file   Tobacco Use   • Smoking status: Never Smoker   • Smokeless tobacco: Never Used   Substance and Sexual Activity   • Alcohol use: No   • Drug use: No   • Sexual activity: Never   Lifestyle   • Physical activity     Days per week: Not on file     Minutes per session: Not on file   • Stress: Not on file   Relationships   • Social connections     Talks on phone: Not on file     Gets together: Not on file     Attends Mor[employer] service: Not on file     Active member of club or organization: Not on file     Attends meetings of clubs or organizations: Not on file     Relationship status: Not on file   • Intimate partner violence     Fear of current or ex partner: Not on file     Emotionally abused: Not on file     Physically abused: Not on file     Forced sexual activity: Not on file   Other Topics Concern   • Behavioral problems No   • Interpersonal relationships No   • Sad or not enjoying activities No   • Suicidal thoughts No   • Poor school performance No   • Reading difficulties No   • Speech difficulties No   • Writing difficulties Yes   • Inadequate sleep No   • Excessive TV viewing No   • Excessive video game use No   • Inadequate exercise No   • Sports related No   • Poor diet Yes   • Family concerns for drug/alcohol abuse No   • Poor oral hygiene Yes   • Bike safety No   • Family concerns vehicle safety No   Social History Narrative   • Not on file        Allergies   Allergen Reactions   • Sulfa Drugs         MEDICATIONS:  Medications relevant to specialty reviewed.    Current Outpatient Medications:   •  Halobetasol Prop-Tazarotene (DUOBRII) 0.01-0.045 % Lotion, 1 Application by Apply externally route every day. To active areas on feet until skin is smooth, Disp: 100 g, Rfl: 5  •  tacrolimus (PROTOPIC) 0.1 % Ointment, Apply twice daily to affected area on the FEET for MAINTENANCE (when skin is improved/not flared), Disp: 60 g, Rfl: 5  •  clobetasol (TEMOVATE) 0.05 % external  solution, Apply daily to affected areas of the scalp for up to 3 weeks, then stop 1 week. Repeat as needed., Disp: 60 mL, Rfl: 3  •  clobetasol (TEMOVATE) 0.05 % Ointment, Apply BID to affected areas of body until skin is smooth or up to 3 weeks, then stop 1 week. Repeat as needed. Do not use on face/genitals., Disp: 60 g, Rfl: 3       REVIEW OF SYSTEMS:   Positive for skin (see HPI)  Negative for fevers, chills, and cough, joint pain       EXAM:  There were no vitals taken for this visit.  Constitutional: Well-developed, well-nourished, and in no distress.   Neurological: Alert and oriented.    A focused skin exam limited by quality of video was performed including the affected areas of the feet. Notable findings on exam today listed below.   Additionally, photos sent by patient via [employer] were reviewed.    - bilateral dorsal feet with scattered well demarcated erythematous plaques with minimal micaceous scale      IMPRESSION / PLAN:    1. Psoriasis, BSA <2%  -psoriatic arthritis? no  -reviewed diagnosis, management options, and expectations of treatment  -reviewed the nature of the condition and that there is no cure   -reviewed associated co-morbidities, including systemic inflammation (i.e. Joint involvement, cardiovascular disease); smoking cessation discussed  -trial of Duobrii (halobetasol 0.01%/tazarotene 0.045%) lotion - apply once daily until clear (if does not cause improvement, then switch back to Taclonex or clobetasol for active areas)  -then trial of Protopic for maintenance BID  -reserveTaclonex for now to see if gets better results with Duobrii   -cont clobetasol 0.05% solution to affected area of the scalp daily up to 3 weeks, stop 1 week, repeat prn - clear today per pt    -Side effects of topical steroids discussed, including systemic absorption, skin thinning, appearance of stretch marks (striae), easy bruising, telangiectasias, and possible increased hair growth   -Patient instructed to avoid  face, axilla, and groin area with above topical steroids  -Continue current tea tree oil shampoo - pt thinks it helps  -discussed importance of moisturizing regularly/several times a day    -if not controlled with above, could try to add occlusion at night or rec in person visit, consider biopsy to rule out other etiology? (eczematous process/ACD) and may consider systemic treatment (acitretin or Otezla) pending severity at time of visit    Return to clinic in: Return if symptoms worsen or fail to improve. and as needed for any new or changing skin lesions.    Alberta Mccarthy M.D.

## 2020-10-26 NOTE — TELEPHONE ENCOUNTER
----- Message from Narciso Casillas sent at 10/25/2020  8:05 PM PDT -----  Regarding: Non-Urgent Medical Question  Contact: [phone]  The cream that I am using was working well at first but over the last few weeks the feet have been getting progressively worse I was wondering if we could schedule a follow up video appointment to see if I should get another cream split the current one of something else. attached are photos of my feet

## 2020-10-27 ENCOUNTER — TELEMEDICINE (OUTPATIENT)
Dept: DERMATOLOGY | Facility: IMAGING CENTER | Age: 20
End: 2020-10-27
Payer: COMMERCIAL

## 2020-10-27 DIAGNOSIS — L40.9 PSORIASIS: ICD-10-CM

## 2020-10-27 PROCEDURE — 99213 OFFICE O/P EST LOW 20 MIN: CPT | Mod: 95,CR | Performed by: DERMATOLOGY

## 2020-10-27 RX ORDER — HALOBETASOL PROPIONATE AND TAZAROTENE .1; .45 MG/G; MG/G
1 LOTION TOPICAL DAILY
Qty: 100 G | Refills: 5 | Status: SHIPPED | OUTPATIENT
Start: 2020-10-27 | End: 2021-09-23

## 2020-10-27 RX ORDER — TACROLIMUS 1 MG/G
OINTMENT TOPICAL
Qty: 60 G | Refills: 5 | Status: SHIPPED | OUTPATIENT
Start: 2020-10-27 | End: 2021-09-23

## 2020-10-29 ENCOUNTER — TELEPHONE (OUTPATIENT)
Dept: DERMATOLOGY | Facility: IMAGING CENTER | Age: 20
End: 2020-10-29

## 2020-10-29 NOTE — TELEPHONE ENCOUNTER
----- Message from Narciso Casillas sent at 10/25/2020  8:05 PM PDT -----  Regarding: Non-Urgent Medical Question  Contact: [phone]  The cream that I am using was working well at first but over the last few weeks the feet have been getting progressively worse I was wondering if we could schedule a follow up video appointment to see if I should get another cream split the current one of something else. attached are photos of my feet

## 2020-11-05 ENCOUNTER — OFFICE VISIT (OUTPATIENT)
Dept: URGENT CARE | Facility: PHYSICIAN GROUP | Age: 20
End: 2020-11-05
Payer: COMMERCIAL

## 2020-11-05 VITALS
WEIGHT: 215 LBS | TEMPERATURE: 99.2 F | RESPIRATION RATE: 18 BRPM | HEIGHT: 67 IN | DIASTOLIC BLOOD PRESSURE: 80 MMHG | SYSTOLIC BLOOD PRESSURE: 138 MMHG | BODY MASS INDEX: 33.74 KG/M2 | HEART RATE: 80 BPM | OXYGEN SATURATION: 98 %

## 2020-11-05 DIAGNOSIS — L08.9 SOFT TISSUE INFECTION: ICD-10-CM

## 2020-11-05 PROCEDURE — 99213 OFFICE O/P EST LOW 20 MIN: CPT | Performed by: FAMILY MEDICINE

## 2020-11-05 ASSESSMENT — ENCOUNTER SYMPTOMS
WEIGHT LOSS: 0
MYALGIAS: 0
EYE DISCHARGE: 0
VOMITING: 0
NAUSEA: 0
EYE REDNESS: 0

## 2020-11-05 NOTE — PROGRESS NOTES
"Subjective:      Narciso Casillas is a 20 y.o. male who presents with Rash (left foot, rash. itchy )            Patient has chronic psoriatic dermatitis that affects feet and is also likely had secondary fungal and bacterial infections.  Currently has left third and foot lesions that are very red with slight yellow crusting.  No significant itching.  No red streaking.  No fever.  No other aggravating or alleviating factors.      Review of Systems   Constitutional: Negative for malaise/fatigue and weight loss.   Eyes: Negative for discharge and redness.   Gastrointestinal: Negative for nausea and vomiting.   Musculoskeletal: Negative for joint pain and myalgias.   Skin: Negative for itching and rash.          Objective:     /80 (BP Location: Right arm, Patient Position: Sitting, BP Cuff Size: Large adult)   Pulse 80   Temp 37.3 °C (99.2 °F) (Temporal)   Resp 18   Ht 1.702 m (5' 7\")   Wt 97.5 kg (215 lb)   SpO2 98%   BMI 33.67 kg/m²      Physical Exam  Constitutional:       General: He is not in acute distress.     Appearance: He is well-developed.   HENT:      Head: Normocephalic and atraumatic.   Eyes:      Conjunctiva/sclera: Conjunctivae normal.   Musculoskeletal:        Feet:    Skin:     General: Skin is warm and dry.      Findings: No rash.   Neurological:      Mental Status: He is alert and oriented to person, place, and time.                 Assessment/Plan:         1. Soft tissue infection  mupirocin (BACTROBAN) 2 % Ointment     Differential diagnosis, natural history, supportive care, and indications for immediate follow-up discussed at length.     This still may represent fungal however he has treated this with previous Rx without improvement.    "

## 2020-11-05 NOTE — LETTER
November 5, 2020         Patient: Narciso Casillas   YOB: 2000   Date of Visit: 11/5/2020           To Whom it May Concern:    Narciso Casillas was seen in my clinic on 11/5/2020. Please excuse 11/7/2020.        Sincerely,           Chon Quan M.D.  Electronically Signed

## 2020-11-14 ENCOUNTER — OFFICE VISIT (OUTPATIENT)
Dept: URGENT CARE | Facility: PHYSICIAN GROUP | Age: 20
End: 2020-11-14
Payer: COMMERCIAL

## 2020-11-14 VITALS
HEART RATE: 70 BPM | OXYGEN SATURATION: 98 % | RESPIRATION RATE: 18 BRPM | DIASTOLIC BLOOD PRESSURE: 70 MMHG | WEIGHT: 215 LBS | SYSTOLIC BLOOD PRESSURE: 114 MMHG | TEMPERATURE: 98.1 F | HEIGHT: 67 IN | BODY MASS INDEX: 33.74 KG/M2

## 2020-11-14 DIAGNOSIS — L08.9 SOFT TISSUE INFECTION: ICD-10-CM

## 2020-11-14 DIAGNOSIS — Z87.2 HISTORY OF PSORIASIS: ICD-10-CM

## 2020-11-14 PROCEDURE — 99214 OFFICE O/P EST MOD 30 MIN: CPT | Performed by: PHYSICIAN ASSISTANT

## 2020-11-14 RX ORDER — CEPHALEXIN 500 MG/1
500 CAPSULE ORAL 4 TIMES DAILY
Qty: 28 CAP | Refills: 0 | Status: SHIPPED | OUTPATIENT
Start: 2020-11-14 | End: 2020-11-21

## 2020-11-14 ASSESSMENT — ENCOUNTER SYMPTOMS
EYE REDNESS: 0
SORE THROAT: 0
HEADACHES: 0
FEVER: 0
SHORTNESS OF BREATH: 0
VOMITING: 0
EYE DISCHARGE: 0
COUGH: 0
NAUSEA: 0

## 2020-11-14 NOTE — PROGRESS NOTES
Subjective:      Narciso Casillas is a 20 y.o. male who presents with Other (sores on both feet, was seen here about a week ago. he was prescribed a cream and it seemed to help but the sores are back.)        Other  This is a recurrent problem. Episode onset: x 10 days ago. The problem occurs constantly. The problem has been unchanged. Associated symptoms include a rash (The patient reports a history of psoriasis.). Pertinent negatives include no chest pain, congestion, coughing, fever, headaches, nausea, sore throat or vomiting.     The patient returns to clinic complaining of persistent sores to his left toes, specifically the left second and third toes.  The patient states he was previously seen in clinic on 11/5/2020 and prescribed a topical antibiotic ointment.  The patient states he has been applying the topical antibiotic ointment twice daily for the past week.  The patient states the sores to his left second and third toes initially were improving.  However, the patient states the ulceration/sore to the left third toe returned to its original size.  The patient also reports a yellow discoloration of the scab.  The patient reports mild discomfort to the left second and third toes.  He also reports intermittent redness, but states this is common given his history of psoriasis.  The patient reports no increased warmth.  No discharge/drainage from the ulcerations.  The patient has not taken any additional medications for his current symptoms.  The patient reports no associated fever.     PMH:  has a past medical history of Allergy, Asperger syndrome, and Psoriasis. He also has no past medical history of Diabetes (HCC) or Hypertension.  MEDS:   Current Outpatient Medications:   •  mupirocin (BACTROBAN) 2 % Ointment, Apply to affected area twice daily for 7 days, Disp: 15 g, Rfl: 0  •  Halobetasol Prop-Tazarotene (DUOBRII) 0.01-0.045 % Lotion, 1 Application by Apply externally route every day. To active areas on  "feet until skin is smooth, Disp: 100 g, Rfl: 5  •  tacrolimus (PROTOPIC) 0.1 % Ointment, Apply twice daily to affected area on the FEET for MAINTENANCE (when skin is improved/not flared), Disp: 60 g, Rfl: 5  •  clobetasol (TEMOVATE) 0.05 % external solution, Apply daily to affected areas of the scalp for up to 3 weeks, then stop 1 week. Repeat as needed., Disp: 60 mL, Rfl: 3  •  clobetasol (TEMOVATE) 0.05 % Ointment, Apply BID to affected areas of body until skin is smooth or up to 3 weeks, then stop 1 week. Repeat as needed. Do not use on face/genitals., Disp: 60 g, Rfl: 3  ALLERGIES:   Allergies   Allergen Reactions   • Amoxicillin    • Sulfa Drugs      SURGHX: History reviewed. No pertinent surgical history.  SOCHX:  reports that he has never smoked. He has never used smokeless tobacco. He reports that he does not drink alcohol or use drugs.  FH: Family history was reviewed, no pertinent findings to report    Review of Systems   Constitutional: Negative for fever.   HENT: Negative for congestion, ear pain and sore throat.    Eyes: Negative for discharge and redness.   Respiratory: Negative for cough and shortness of breath.    Cardiovascular: Negative for chest pain and leg swelling.   Gastrointestinal: Negative for nausea and vomiting.   Musculoskeletal: Negative for joint pain.   Skin: Positive for rash (The patient reports a history of psoriasis.).   Neurological: Negative for headaches.          Objective:     /70   Pulse 70   Temp 36.7 °C (98.1 °F) (Temporal)   Resp 18   Ht 1.702 m (5' 7\")   Wt 97.5 kg (215 lb)   SpO2 98%   BMI 33.67 kg/m²      Physical Exam  Constitutional:       General: He is not in acute distress.     Appearance: Normal appearance. He is well-developed. He is not ill-appearing.   HENT:      Head: Normocephalic and atraumatic.      Right Ear: External ear normal.      Left Ear: External ear normal.      Nose: Nose normal.   Eyes:      Extraocular Movements: Extraocular " movements intact.      Conjunctiva/sclera: Conjunctivae normal.   Neck:      Musculoskeletal: Normal range of motion and neck supple.   Cardiovascular:      Rate and Rhythm: Normal rate.   Pulmonary:      Effort: Pulmonary effort is normal.   Musculoskeletal:      Comments:   Left Foot:  Superficial ulcerations to the left second and third toes with mild tenderness to palpation, surrounding erythema, and slight increased warmth.  No ecchymosis.  No discharge/weeping.  The ulceration to the left second toe measures 1 than 1 cm.The ulceration to the left third toe measures approximately 1 cm in length and was 1 cm in width.  The patient demonstrates full active range of motion of the left toes.  Neurovascular intact  Normal gait   Skin:     General: Skin is warm and dry.   Neurological:      Mental Status: He is alert and oriented to person, place, and time.                 Assessment/Plan:        1. Soft tissue infection  - cephALEXin (KEFLEX) 500 MG Cap; Take 1 Cap by mouth 4 times a day for 7 days.  Dispense: 28 Cap; Refill: 0  - mupirocin (BACTROBAN) 2 % Ointment; Apply 1 Application topically 3 times a day for 10 days.  Dispense: 22 g; Refill: 0    2. History of psoriasis     Differential diagnoses, supportive care, and indications for immediate follow-up discussed with patient.   Instructed to return to clinic or nearest emergency department for any change in condition, further concerns, or worsening of symptoms.    OTC Tylenol or Motrin for fever/discomfort.  Keep ulcerations clean and dry  Monitor for secondary signs of infection  Follow-up with Dermatology  Follow-up with PCP as needed  Return to clinic or go to the ED if symptoms worsen or fail to improve, or if the patient should develop worsening/increasing/persistent ulcerations to the left foot, pain/tightness to the affected area, swelling, increased redness or warmth, discharge/drainage, fever/chills, secondary signs of infection, and/or any concerning  symptoms.      Discussed plan with the patient, and he agrees to the above.    Please note that this dictation was created using voice recognition software. I have made every reasonable attempt to correct obvious errors, but I expect that there may be errors of grammar and possibly content that I did not discover before finalizing the note.

## 2021-09-23 ENCOUNTER — OFFICE VISIT (OUTPATIENT)
Dept: URGENT CARE | Facility: PHYSICIAN GROUP | Age: 21
End: 2021-09-23
Payer: COMMERCIAL

## 2021-09-23 VITALS
OXYGEN SATURATION: 99 % | HEIGHT: 71 IN | RESPIRATION RATE: 18 BRPM | HEART RATE: 94 BPM | BODY MASS INDEX: 29.4 KG/M2 | TEMPERATURE: 97.9 F | DIASTOLIC BLOOD PRESSURE: 74 MMHG | SYSTOLIC BLOOD PRESSURE: 116 MMHG | WEIGHT: 210 LBS

## 2021-09-23 DIAGNOSIS — L40.9 PSORIASIS: ICD-10-CM

## 2021-09-23 DIAGNOSIS — L08.9 SKIN INFECTION: ICD-10-CM

## 2021-09-23 PROCEDURE — 99214 OFFICE O/P EST MOD 30 MIN: CPT | Performed by: PHYSICIAN ASSISTANT

## 2021-09-23 RX ORDER — CEPHALEXIN 500 MG/1
500 CAPSULE ORAL 4 TIMES DAILY
Qty: 20 CAPSULE | Refills: 0 | Status: SHIPPED | OUTPATIENT
Start: 2021-09-23 | End: 2021-09-28

## 2021-09-23 ASSESSMENT — ENCOUNTER SYMPTOMS
NAUSEA: 0
VOMITING: 0
FEVER: 0
CHILLS: 0

## 2021-09-23 NOTE — LETTER
September 23, 2021         Patient: Narciso Casillas   YOB: 2000   Date of Visit: 9/23/2021           To Whom it May Concern:    Narciso Casillas was seen in my clinic on 9/23/2021. Please excuse him from work 9/24 - 9/25.     If you have any questions or concerns, please don't hesitate to call.        Sincerely,           Eileen Sanders P.A.-C.  Electronically Signed

## 2021-09-24 NOTE — PROGRESS NOTES
"Subjective:   Narciso Casillas is a 21 y.o. male who presents for Rash (rash on both feet,right leg,swollen, painful,x3 month )      HPI  21 y.o. male with history of psoriasis presents to urgent care with new problem to provider of chronic rash that has been worsening over the last few weeks with new sores and increased pain and erythema.  He reports history of similar with cellulitic infection.  He denies fevers.  Patient has been using previously prescribed topical treatment prescribed by dermatology for psoriasis with minimal symptomatic relief.    Review of Systems   Constitutional: Negative for chills and fever.   Gastrointestinal: Negative for nausea and vomiting.   Skin: Positive for rash. Negative for itching.       Patient Active Problem List   Diagnosis   • Asperger syndrome   • Obesity (BMI 30.0-34.9)   • Psoriasis     History reviewed. No pertinent surgical history.  Social History     Tobacco Use   • Smoking status: Never Smoker   • Smokeless tobacco: Never Used   Vaping Use   • Vaping Use: Never used   Substance Use Topics   • Alcohol use: No   • Drug use: No      Family History   Problem Relation Age of Onset   • No Known Problems Mother    • Lung Disease Father         asthma   • Asthma Brother    • Lung Disease Brother         asthma   • No Known Problems Maternal Grandmother    • No Known Problems Maternal Grandfather    • No Known Problems Paternal Grandmother    • No Known Problems Paternal Grandfather       (Allergies, Medications, & Tobacco/Substance Use were reconciled by the Medical Assistant and reviewed by myself. The family history is prepopulated)     Objective:     /74 (BP Location: Left arm, Patient Position: Sitting, BP Cuff Size: Adult)   Pulse 94   Temp 36.6 °C (97.9 °F) (Temporal)   Resp 18   Ht 1.803 m (5' 11\")   Wt 95.3 kg (210 lb)   SpO2 99%   BMI 29.29 kg/m²     Physical Exam  Vitals reviewed.   Constitutional:       Appearance: Normal appearance. He is " well-developed.   HENT:      Head: Normocephalic and atraumatic.   Eyes:      Conjunctiva/sclera: Conjunctivae normal.   Cardiovascular:      Rate and Rhythm: Normal rate.   Pulmonary:      Effort: Pulmonary effort is normal. No respiratory distress.   Abdominal:      Palpations: Abdomen is soft.   Musculoskeletal:      Cervical back: Normal range of motion and neck supple.   Skin:     General: Skin is warm and dry.          Neurological:      Mental Status: He is alert and oriented to person, place, and time.   Psychiatric:         Mood and Affect: Mood normal.         Behavior: Behavior normal.         Thought Content: Thought content normal.         Judgment: Judgment normal.         Assessment/Plan:     1. Skin infection  mupirocin (BACTROBAN) 2 % Ointment    cephALEXin (KEFLEX) 500 MG Cap    REFERRAL TO DERMATOLOGY   2. Psoriasis  REFERRAL TO DERMATOLOGY     Continue use of previously prescribed ointments for treatment of psoriasis.  Follow-up with dermatology as referred.  Continue to monitor for worsening signs of infection.  Discussed return precautions.    Differential diagnosis, natural history, supportive care, and indications for immediate follow-up discussed.    Advised the patient to follow-up with the primary care physician for recheck, reevaluation, and consideration of further management.  Patient verbalized understanding of treatment plan and has no further questions regarding care.     I personally reviewed prior external notes and test results pertinent to today's visit.   Please note that this dictation was created using voice recognition software. I have made a reasonable attempt to correct obvious errors, but I expect that there are errors of grammar and possibly content that I did not discover before finalizing the note.    This note was electronically signed by Eileen Sanders PA-C

## 2021-10-04 ENCOUNTER — OFFICE VISIT (OUTPATIENT)
Dept: MEDICAL GROUP | Facility: PHYSICIAN GROUP | Age: 21
End: 2021-10-04
Payer: COMMERCIAL

## 2021-10-04 VITALS
WEIGHT: 204.7 LBS | DIASTOLIC BLOOD PRESSURE: 80 MMHG | HEART RATE: 93 BPM | HEIGHT: 67 IN | BODY MASS INDEX: 32.13 KG/M2 | OXYGEN SATURATION: 95 % | TEMPERATURE: 98.1 F | SYSTOLIC BLOOD PRESSURE: 112 MMHG

## 2021-10-04 DIAGNOSIS — Z23 NEED FOR VACCINATION: ICD-10-CM

## 2021-10-04 DIAGNOSIS — E03.8 SUBCLINICAL HYPOTHYROIDISM: ICD-10-CM

## 2021-10-04 DIAGNOSIS — Z13.220 LIPID SCREENING: ICD-10-CM

## 2021-10-04 DIAGNOSIS — Z13.21 ENCOUNTER FOR VITAMIN DEFICIENCY SCREENING: ICD-10-CM

## 2021-10-04 DIAGNOSIS — E66.9 OBESITY (BMI 30.0-34.9): ICD-10-CM

## 2021-10-04 DIAGNOSIS — Z00.00 HEALTH CARE MAINTENANCE: ICD-10-CM

## 2021-10-04 DIAGNOSIS — E55.9 VITAMIN D DEFICIENCY: ICD-10-CM

## 2021-10-04 DIAGNOSIS — R73.01 IMPAIRED FASTING GLUCOSE: ICD-10-CM

## 2021-10-04 DIAGNOSIS — L40.9 PSORIASIS: ICD-10-CM

## 2021-10-04 PROCEDURE — 90686 IIV4 VACC NO PRSV 0.5 ML IM: CPT | Performed by: INTERNAL MEDICINE

## 2021-10-04 PROCEDURE — 99214 OFFICE O/P EST MOD 30 MIN: CPT | Mod: 25 | Performed by: INTERNAL MEDICINE

## 2021-10-04 PROCEDURE — 90715 TDAP VACCINE 7 YRS/> IM: CPT | Performed by: INTERNAL MEDICINE

## 2021-10-04 PROCEDURE — 90471 IMMUNIZATION ADMIN: CPT | Performed by: INTERNAL MEDICINE

## 2021-10-04 PROCEDURE — 90472 IMMUNIZATION ADMIN EACH ADD: CPT | Performed by: INTERNAL MEDICINE

## 2021-10-04 RX ORDER — KETOCONAZOLE 20 MG/G
CREAM TOPICAL DAILY
COMMUNITY

## 2021-10-04 NOTE — PROGRESS NOTES
"Established Patient    Chief Complaint   Patient presents with   • Annual Exam       Subjective:     HPI:   Narciso presents today with the following.    2. Subclinical hypothyroidism  3. Obesity (BMI 30.0-34.9)  6. Impaired fasting glucose  Vitamin D deficiency  -Patient has lab last year, never follow-up, he had vitamin D deficiency, prediabetes level of A1c, obesity, elevated TSH, patient currently denied having symptoms, he would like to repeat this lab again and check his status, continue follow-up with dermatology for his psoriasis          Patient Active Problem List    Diagnosis Date Noted   • Subclinical hypothyroidism 10/04/2021   • Obesity (BMI 30.0-34.9) 06/23/2020   • Psoriasis 06/23/2020   • Asperger syndrome 07/25/2017       Current Outpatient Medications on File Prior to Visit   Medication Sig Dispense Refill   • Halobetasol Propionate 0.01 % Lotion Apply  topically.     • ketoconazole (NIZORAL) 2 % Cream Apply  topically every day.     • clobetasol (TEMOVATE) 0.05 % Ointment Apply BID to affected areas of body until skin is smooth or up to 3 weeks, then stop 1 week. Repeat as needed. Do not use on face/genitals. 60 g 3     No current facility-administered medications on file prior to visit.       Allergies, past medical history, past surgical history, family history, social history reviewed and updated    ROS:  All other systems reviewed and are negative except as stated in the HPI       Physical Exam:     /80 (BP Location: Left arm, Patient Position: Sitting, BP Cuff Size: Adult)   Pulse 93   Temp 36.7 °C (98.1 °F) (Temporal)   Ht 1.714 m (5' 7.48\")   Wt 92.9 kg (204 lb 11.2 oz)   SpO2 95%   BMI 31.61 kg/m²   General: Normal appearing. No distress.  ENT: oropharynx without exudates.    Eyes: conjunctiva clear lids without ptosis  Pulmonary: Clear to ausculation.  Normal effort.   Cardiovascular: Regular rate and rhythm  Abdomen: Soft, nontender,  Lymph: No cervical or supraclavicular " palpable lymph nodes  Psych: Normal mood and affect.     I have reviewed pertinent labs and diagnostic tests associated with this visit with specific comments listed under the assessment and plan below      Assessment and Plan:     21 y.o. male with the following issues.    1. Need for vaccination  - INFLUENZA VACCINE QUAD INJ (PF)  - Tdap Vaccine =>8YO IM    2. Subclinical hypothyroidism  3. Obesity (BMI 30.0-34.9)  6. Impaired fasting glucose  Vitamin D deficiency  - CBC WITH DIFFERENTIAL; Future  - Comp Metabolic Panel; Future  - CRP HIGH SENSITIVE (CARDIAC); Future  - FREE THYROXINE; Future  - MAGNESIUM; Future  - TSH; Future  - THYROID PEROXIDASE  (TPO) AB; Future  - T3 FREE; Future    5. Encounter for vitamin deficiency screening  - VITAMIN D,25 HYDROXY; Future  - VITAMIN B12; Future    6. Impaired fasting glucose  - HEMOGLOBIN A1C; Future    7. Lipid screening  - LipoFit by NMR; Future    8. Health care maintenance  - MAGNESIUM; Future    >> Highly suspicious for sleep apnea, will check his family if he is having snoring and other risk for sleep apnea, patient has some retrognathia, overcrowded back of the mouth  Follow Up:      Return in about 4 weeks (around 11/1/2021).  Labs,  Please note that this dictation was created using voice recognition software. I have made every reasonable attempt to correct obvious errors, but I expect that there are errors of grammar and possibly content that I did not discover before finalizing the note.    Signed by: Sade Stuart M.D.

## 2023-05-09 ENCOUNTER — OFFICE VISIT (OUTPATIENT)
Dept: URGENT CARE | Facility: PHYSICIAN GROUP | Age: 23
End: 2023-05-09
Payer: COMMERCIAL

## 2023-05-09 VITALS
HEART RATE: 64 BPM | BODY MASS INDEX: 32.06 KG/M2 | DIASTOLIC BLOOD PRESSURE: 80 MMHG | TEMPERATURE: 97.9 F | WEIGHT: 229 LBS | OXYGEN SATURATION: 100 % | HEIGHT: 71 IN | SYSTOLIC BLOOD PRESSURE: 122 MMHG | RESPIRATION RATE: 16 BRPM

## 2023-05-09 DIAGNOSIS — H93.8X2 EAR FULLNESS, LEFT: ICD-10-CM

## 2023-05-09 PROCEDURE — 99213 OFFICE O/P EST LOW 20 MIN: CPT | Performed by: PHYSICIAN ASSISTANT

## 2023-05-09 ASSESSMENT — ENCOUNTER SYMPTOMS
FEVER: 0
FATIGUE: 0
COUGH: 1
SORE THROAT: 0

## 2023-05-09 NOTE — PROGRESS NOTES
"Subjective:   Narciso Casillas is a 22 y.o. male who presents for Ear Fullness (Left ear, started today. )        Ear Fullness  This is a new problem. The current episode started today. The problem occurs constantly. The problem has been gradually worsening. Associated symptoms include coughing (mild). Pertinent negatives include no fatigue, fever or sore throat. Nothing aggravates the symptoms. He has tried nothing for the symptoms.   Review of Systems   Constitutional:  Negative for fatigue and fever.   HENT:  Negative for sore throat.    Respiratory:  Positive for cough (mild).      PMH:  has a past medical history of Allergy, Asperger syndrome, and Psoriasis.    He has no past medical history of Diabetes (HCC) or Hypertension.  MEDS:   Current Outpatient Medications:     Halobetasol Propionate 0.01 % Lotion, Apply  topically., Disp: , Rfl:     ketoconazole (NIZORAL) 2 % Cream, Apply  topically every day., Disp: , Rfl:     clobetasol (TEMOVATE) 0.05 % Ointment, Apply BID to affected areas of body until skin is smooth or up to 3 weeks, then stop 1 week. Repeat as needed. Do not use on face/genitals., Disp: 60 g, Rfl: 3  ALLERGIES:   Allergies   Allergen Reactions    Amoxicillin     Sulfa Drugs      SURGHX: History reviewed. No pertinent surgical history.  SOCHX:  reports that he has never smoked. He has never used smokeless tobacco. He reports that he does not drink alcohol and does not use drugs.  FH: Family history was reviewed, no pertinent findings to report   Objective:   /80 (BP Location: Right arm, Patient Position: Sitting, BP Cuff Size: Adult)   Pulse 64   Temp 36.6 °C (97.9 °F) (Temporal)   Resp 16   Ht 1.803 m (5' 11\")   Wt 104 kg (229 lb)   SpO2 100%   BMI 31.94 kg/m²   Physical Exam  Vitals reviewed.   Constitutional:       General: He is not in acute distress.     Appearance: Normal appearance. He is well-developed. He is not toxic-appearing.   HENT:      Head: Normocephalic and " atraumatic.      Right Ear: External ear normal.      Left Ear: External ear normal.      Ears:      Comments: Patient has bilateral cerumen impactions.  Ears were lavaged and patient reports resolution of his symptoms.  TMs are pearly white, intact with positive light reflex bilaterally.  No erythema or edema to EACs bilaterally.     Nose: Nose normal.   Cardiovascular:      Rate and Rhythm: Normal rate and regular rhythm.   Pulmonary:      Effort: Pulmonary effort is normal. No respiratory distress.      Breath sounds: No stridor.   Skin:     General: Skin is dry.   Neurological:      Comments: Alert and oriented.    Psychiatric:         Speech: Speech normal.         Behavior: Behavior normal.         Assessment/Plan:   1. Ear fullness, left    Patient may use OTC Debrox drops in the future.  Return precautions reviewed.

## 2023-09-21 ENCOUNTER — TELEPHONE (OUTPATIENT)
Dept: HEALTH INFORMATION MANAGEMENT | Facility: OTHER | Age: 23
End: 2023-09-21
Payer: COMMERCIAL

## 2023-10-13 ENCOUNTER — OFFICE VISIT (OUTPATIENT)
Dept: URGENT CARE | Facility: PHYSICIAN GROUP | Age: 23
End: 2023-10-13
Payer: COMMERCIAL

## 2023-10-13 VITALS
OXYGEN SATURATION: 99 % | SYSTOLIC BLOOD PRESSURE: 118 MMHG | TEMPERATURE: 97.7 F | DIASTOLIC BLOOD PRESSURE: 64 MMHG | WEIGHT: 231 LBS | HEART RATE: 64 BPM | HEIGHT: 71 IN | BODY MASS INDEX: 32.34 KG/M2 | RESPIRATION RATE: 16 BRPM

## 2023-10-13 DIAGNOSIS — R10.32 LLQ ABDOMINAL PAIN: ICD-10-CM

## 2023-10-13 PROCEDURE — 99212 OFFICE O/P EST SF 10 MIN: CPT | Performed by: NURSE PRACTITIONER

## 2023-10-13 PROCEDURE — 3074F SYST BP LT 130 MM HG: CPT | Performed by: NURSE PRACTITIONER

## 2023-10-13 PROCEDURE — 3078F DIAST BP <80 MM HG: CPT | Performed by: NURSE PRACTITIONER

## 2023-10-13 ASSESSMENT — ENCOUNTER SYMPTOMS
DIARRHEA: 0
CONSTIPATION: 0
VOMITING: 0
EYES NEGATIVE: 1
FEVER: 0
FLANK PAIN: 0
CARDIOVASCULAR NEGATIVE: 1
NEUROLOGICAL NEGATIVE: 1
NAUSEA: 0
HEARTBURN: 0
CONSTITUTIONAL NEGATIVE: 1
RESPIRATORY NEGATIVE: 1
ABDOMINAL PAIN: 1
BLOOD IN STOOL: 0

## 2023-10-13 NOTE — PROGRESS NOTES
"Subjective:   Narciso Casillas is a 23 y.o. male who presents for LLQ Pain (Today; )      Presents for evaluation of sudden onset of left lower quadrant abdominal pain which lasted for approximately 20 minutes and then resolved.  Patient states he has no other associated symptoms, including diarrhea, constipation, nausea, vomiting, fever, or chills.  Patient states that he feels completely back to normal with no pain at all, but wanted to be evaluated as the episode was quite severe while it lasted.     LLQ Pain  This is a new problem. The current episode started today. The onset quality is sudden (Constant for 20 minutes and then resolved). Duration: 20 minutes. The problem has been resolved. The pain is located in the LLQ. The pain is at a severity of 8/10. The pain is severe. The quality of the pain is colicky and sharp. The abdominal pain does not radiate. Pertinent negatives include no constipation, diarrhea, dysuria, fever, frequency, hematuria, melena, nausea or vomiting. Nothing aggravates the pain. The pain is relieved by Nothing.       Review of Systems   Constitutional: Negative.  Negative for fever.   HENT: Negative.     Eyes: Negative.    Respiratory: Negative.     Cardiovascular: Negative.    Gastrointestinal:  Positive for abdominal pain. Negative for blood in stool, constipation, diarrhea, heartburn, melena, nausea and vomiting.   Genitourinary:  Negative for dysuria, flank pain, frequency, hematuria and urgency.   Skin: Negative.    Neurological: Negative.        Medications, Allergies, and current problem list reviewed today in Epic.     Objective:     /64 (BP Location: Right arm, Patient Position: Sitting, BP Cuff Size: Adult)   Pulse 64   Temp 36.5 °C (97.7 °F) (Temporal)   Resp 16   Ht 1.803 m (5' 11\")   Wt 105 kg (231 lb)   SpO2 99%     Physical Exam  Vitals reviewed.   Constitutional:       General: He is not in acute distress.     Appearance: Normal appearance. He is not " ill-appearing.   HENT:      Head: Normocephalic and atraumatic.      Nose: Nose normal.      Mouth/Throat:      Mouth: Mucous membranes are moist.      Pharynx: Oropharynx is clear.   Eyes:      Extraocular Movements: Extraocular movements intact.      Conjunctiva/sclera: Conjunctivae normal.      Pupils: Pupils are equal, round, and reactive to light.   Cardiovascular:      Rate and Rhythm: Normal rate and regular rhythm.      Pulses: Normal pulses.      Heart sounds: Normal heart sounds.   Pulmonary:      Effort: Pulmonary effort is normal.      Breath sounds: Normal breath sounds.   Abdominal:      General: Abdomen is flat. Bowel sounds are normal.      Palpations: Abdomen is soft.      Tenderness: There is no abdominal tenderness. There is no right CVA tenderness, left CVA tenderness, guarding or rebound. Negative signs include Márquez's sign, Rovsing's sign, McBurney's sign, psoas sign and obturator sign.      Hernia: No hernia is present.      Comments: Patient's abdominal exam is completely benign today.  No pain to palpation, rebound, guarding.  No masses or organomegaly.   Musculoskeletal:         General: Normal range of motion.      Cervical back: Normal range of motion and neck supple.   Skin:     General: Skin is warm and dry.      Capillary Refill: Capillary refill takes less than 2 seconds.   Neurological:      General: No focal deficit present.      Mental Status: He is alert and oriented to person, place, and time.   Psychiatric:         Mood and Affect: Mood normal.         Behavior: Behavior normal.         Assessment/Plan:     Diagnosis and associated orders:     1. LLQ abdominal pain           Comments/MDM:     Patient's abdominal exam is completely benign in clinic today.  At this time I am unclear as to what the cause of this episode of pain may have been.  As his symptoms have completely resolved after a 20-minute episode, discussed with patient watchful waiting.  Patient will monitor his  symptoms, and if he has any return of this pain or develops any further symptoms, he will present back to urgent care or ER depending on the timing.  Patient verbalizes understanding and is in agreement with this plan.         Differential diagnosis, natural history, supportive care, and indications for immediate follow-up discussed.    Advised the patient to follow-up with the primary care physician for recheck, reevaluation, and consideration of further management.    Please note that this dictation was created using voice recognition software. I have made a reasonable attempt to correct obvious errors, but I expect that there are errors of grammar and possibly content that I did not discover before finalizing the note.    This note was electronically signed by RAPHAEL Shell